# Patient Record
Sex: FEMALE
[De-identification: names, ages, dates, MRNs, and addresses within clinical notes are randomized per-mention and may not be internally consistent; named-entity substitution may affect disease eponyms.]

---

## 2020-12-03 PROBLEM — Z00.00 ENCOUNTER FOR PREVENTIVE HEALTH EXAMINATION: Status: ACTIVE | Noted: 2020-12-03

## 2021-02-04 ENCOUNTER — APPOINTMENT (OUTPATIENT)
Dept: GASTROENTEROLOGY | Facility: CLINIC | Age: 56
End: 2021-02-04

## 2021-04-15 ENCOUNTER — APPOINTMENT (OUTPATIENT)
Dept: GASTROENTEROLOGY | Facility: CLINIC | Age: 56
End: 2021-04-15

## 2022-02-25 ENCOUNTER — APPOINTMENT (OUTPATIENT)
Dept: NEUROLOGY | Facility: CLINIC | Age: 57
End: 2022-02-25

## 2022-03-09 ENCOUNTER — APPOINTMENT (OUTPATIENT)
Dept: NEUROLOGY | Facility: CLINIC | Age: 57
End: 2022-03-09

## 2022-12-05 ENCOUNTER — EMERGENCY (EMERGENCY)
Facility: HOSPITAL | Age: 57
LOS: 1 days | Discharge: HOME | End: 2022-12-05
Attending: EMERGENCY MEDICINE
Payer: COMMERCIAL

## 2022-12-05 VITALS
RESPIRATION RATE: 20 BRPM | OXYGEN SATURATION: 98 % | SYSTOLIC BLOOD PRESSURE: 130 MMHG | HEART RATE: 77 BPM | TEMPERATURE: 99 F | WEIGHT: 162.92 LBS | DIASTOLIC BLOOD PRESSURE: 76 MMHG

## 2022-12-05 LAB
FLUAV AG NPH QL: SIGNIFICANT CHANGE UP
FLUBV AG NPH QL: SIGNIFICANT CHANGE UP
RSV RNA NPH QL NAA+NON-PROBE: SIGNIFICANT CHANGE UP
SARS-COV-2 RNA SPEC QL NAA+PROBE: SIGNIFICANT CHANGE UP

## 2022-12-05 PROCEDURE — 87637 SARSCOV2&INF A&B&RSV AMP PRB: CPT

## 2022-12-05 PROCEDURE — 99284 EMERGENCY DEPT VISIT MOD MDM: CPT

## 2022-12-05 PROCEDURE — 99283 EMERGENCY DEPT VISIT LOW MDM: CPT

## 2022-12-05 RX ORDER — ACETAMINOPHEN 500 MG
650 TABLET ORAL ONCE
Refills: 0 | Status: COMPLETED | OUTPATIENT
Start: 2022-12-05 | End: 2022-12-05

## 2022-12-05 RX ADMIN — Medication 650 MILLIGRAM(S): at 10:06

## 2022-12-05 NOTE — ED PROVIDER NOTE - OBJECTIVE STATEMENT
57 year old female with PMHx of HTN, high cholesterol, and brain aneurysms c/o flu-like symptoms for the last week. States she has nasal congestion, fever, headache, sore throat, body aches, cough for the last night. Symptoms went away after taking nyquil then returned. States she has chest and back discomfort when coughing; productive cough with green phlegm. States had diarrhea this morning. Last ate last night and does not have problem with drinking fluids. States her  has been sick with the flu for the last week and a half. Denies chest pain, SOB, abdominal pain, n/v, urinary complaints, and ear pain. Has 3 COVID vaccines (Pfizer); does not have flu vaccine. Last took tylenol 2 days ago with relief.

## 2022-12-05 NOTE — ED PROVIDER NOTE - PROGRESS NOTE DETAILS
Feeling better. Discussed swab results with patient. Discussed use of OTC medications if needed, fluids, and rest.

## 2022-12-05 NOTE — ED PROVIDER NOTE - NS ED ATTENDING STATEMENT MOD
I have seen and examined this patient and fully participated in the care of this patient as the teaching attending.  The service was shared with the RIZWANA.  I reviewed and verified the documentation and independently performed the documented:

## 2022-12-05 NOTE — ED PROVIDER NOTE - NS ED ROS FT
ENMT: +nasal congestion. +sore throat. Denies ear pain, difficulty swallowing.  Cardio: chest discomfort when coughing.  GI: +diarrhea this morning. Denies abdominal pain, nausea, vomiting, constipation.

## 2022-12-05 NOTE — ED PROVIDER NOTE - NSFOLLOWUPINSTRUCTIONS_ED_ALL_ED_FT
Viral Respiratory Infection    A viral respiratory infection is an illness that affects parts of the body used for breathing, like the lungs, nose, and throat. It is caused by a germ called a virus. Symptoms can include runny nose, coughing, sneezing, fatigue, body aches, sore throat, fever, or headache. Over the counter medicine can be used to manage the symptoms but the infection typically goes away on its own in 5 to 10 days.     SEEK IMMEDIATE MEDICAL CARE IF YOU HAVE ANY OF THE FOLLOWING SYMPTOMS: shortness of breath, chest pain, fever over 10 days, or lightheadedness/dizziness.    Follow-up with your doctor within 2-3 days.  You can take Tylenol or Motrin over the counter, if needed.

## 2022-12-05 NOTE — ED PROVIDER NOTE - PATIENT PORTAL LINK FT
You can access the FollowMyHealth Patient Portal offered by [facility] by registering at the following website: [email or web address]. By joining [employer]’s FollowMyHealth portal, you will also be able to view your health information using other applications (apps) compatible with our system.

## 2022-12-05 NOTE — ED PROVIDER NOTE - ATTENDING CONTRIBUTION TO CARE
pt with cough and fever with + sick contacts pe awake alert in nad heent ncat neck supple cor s1s2 lungs clear abd soft nontender neuro nonfocal  dx couigh;

## 2022-12-05 NOTE — ED PROVIDER NOTE - CLINICAL SUMMARY MEDICAL DECISION MAKING FREE TEXT BOX
57 year old female with PMHx of HTN, high cholesterol, and brain aneurysms c/o flu-like symptoms for the last week. States she has nasal congestion, fever, headache, sore throat, body aches, cough for the last night. Symptoms went away after taking nyquil then returned. States she has chest and back discomfort when coughing; productive cough with green phlegm. States had diarrhea this morning. Last ate last night and does not have problem with drinking fluids. States her  has been sick with the flu for the last week and a half. Denies chest pain, SOB, abdominal pain, n/v, urinary complaints, and ear pain. Has 3 COVID vaccines (Pfizer); does not have flu vaccine. Last took tylenol 2 days ago with relief.    Tylenol. Swab. Reassess.

## 2022-12-05 NOTE — ED ADULT TRIAGE NOTE - BP NONINVASIVE DIASTOLIC (MM HG)
"[facility] [place]  Arun San APRN      Sandi DELUCA Kristina / 70 y.o. / female  12/08/2020      VITAL SIGNS     Visit Vitals  /68   Pulse 77   Temp 96.4 °F (35.8 °C) (Temporal)   Ht 157.5 cm (62.01\")   Wt 65.4 kg (144 lb 3.2 oz)   LMP  (LMP Unknown)   SpO2 98%   Breastfeeding No   BMI 26.37 kg/m²       BP Readings from Last 3 Encounters:   12/08/20 150/68   10/15/20 130/60   07/06/20 130/70     Wt Readings from Last 3 Encounters:   12/08/20 65.4 kg (144 lb 3.2 oz)   10/15/20 64.3 kg (141 lb 12.8 oz)   07/06/20 64.4 kg (142 lb)     Body mass index is 26.37 kg/m².      MEDICATIONS     Current Outpatient Medications   Medication Sig Dispense Refill   • amLODIPine (NORVASC) 2.5 MG tablet TAKE 1 TABLET BY MOUTH EVERY DAY 90 tablet 3   • amLODIPine (NORVASC) 5 MG tablet TAKE 1 AND 1/2 TABLETS BY MOUTH EVERY  tablet 1   • ASPIRIN 81 PO Take 1 tablet by mouth Daily.     • calcium carbonate (OS-ROCIO) 600 MG tablet Take 600 mg by mouth daily.     • cholecalciferol (VITAMIN D3) 1000 UNITS tablet Take 1,000 Units by mouth daily.     • Flaxseed, Linseed, (FLAXSEED OIL PO) Take  by mouth.     • fluticasone (FLONASE) 50 MCG/ACT nasal spray 2 sprays by Each Nare route Daily. 48 mL 2   • Probiotic Product (PROBIOTIC DAILY PO) Take  by mouth.     • vitamin E 1000 UNIT capsule Take 1,000 Units by mouth Daily.     • Zinc 50 MG capsule Take  by mouth.     • cephalexin (Keflex) 500 MG capsule Take 1 capsule by mouth 2 (Two) Times a Day for 7 days. 14 capsule 0   • cyclobenzaprine (FLEXERIL) 5 MG tablet Take 1 tablet by mouth 3 (Three) Times a Day As Needed for Muscle Spasms. 21 tablet 0   • methylPREDNISolone (MEDROL) 4 MG dose pack Take as directed on package instructions. 1 each 0   • mometasone (ELOCON) 0.1 % ointment      • Multiple Vitamins-Minerals ([employer] SPECTRAVITE ADULT 50+ PO) Take  by mouth.       No current facility-administered medications for this visit.        CHIEF COMPLAINT     Sciatica and Urinary Tract " Infection (odor, previous use of Augmentin)      ASSESSMENT & PLAN     Problem List Items Addressed This Visit        Nervous and Auditory    Sciatica of left side - Primary    Relevant Orders    Ambulatory Referral to Pain Management      Other Visit Diagnoses     Foul smelling urine        Relevant Orders    POCT urinalysis dipstick, automated (Completed)    Acute cystitis without hematuria            Orders Placed This Encounter   Procedures   • Ambulatory Referral to Pain Management   • POCT urinalysis dipstick, automated     New Medications Ordered This Visit   Medications   • methylPREDNISolone (MEDROL) 4 MG dose pack     Sig: Take as directed on package instructions.     Dispense:  1 each     Refill:  0   • cyclobenzaprine (FLEXERIL) 5 MG tablet     Sig: Take 1 tablet by mouth 3 (Three) Times a Day As Needed for Muscle Spasms.     Dispense:  21 tablet     Refill:  0   • cephalexin (Keflex) 500 MG capsule     Sig: Take 1 capsule by mouth 2 (Two) Times a Day for 7 days.     Dispense:  14 capsule     Refill:  0       Summary/Discussion:  • Recurrent acute cystitis.  Last treated with Augmentin 875 mg on October 15.  On urinalysis small leukocytes and trace blood.  Will treat empirically with Keflex 500 mg twice daily for 7 days.  • Medrol pack prescription of Flexeril 5 mg 3 times daily as needed for 1 week.  Prescription of Medrol pack for temporary relief of inflammation until she is able to follow-up with pain management for potential injections.  She refuses x-rays of her lumbar spine today.  She does not want into the main hospital due to COVID-19.  • Follow-up as needed or if symptoms worsen or do not improve.  She is to monitor any loss of bowel or bladder or weakness of her lower extremities.  She is to monitor for fever or worsening dysuria or frequency.    Next Appointment with me: Visit date not found    Return if symptoms worsen or fail to  improve.    _____________________________________________________________________________________    HISTORY OF PRESENT ILLNESS     Patient and her medical problems are new to me.  Patient of Dr. Clark.    Acute cystitis/foul urine odor  Last treated on October 15 by PCP with Augmentin 875 mg.  Urine culture showed E. coli.  Today patient is still having small dysuria and frequency along with foul urine odor.  She denies any flank pain, fever, pelvic pain.  Analysis dipstick reveals small leukocytes and trace blood.    Sciatica of left side  Presents with main concern of sciatica radiating down her left leg.  She denies any numbness or tingling or any loss of bladder or bowel.  Pain radiates from her left lumbar hip to foot.  She has taken Tylenol with minimal relief.  She does feel some back tightness and spasms.  She is just left today physical therapy which she was referred to by her PCP.  She does not believe physical therapy is helping her pain.  She declines any x-rays today due to COVID-19 exposure in the main hospital.  She does have some tenderness in her left lumbar area minimal decreased range of motion due to pain.      Patient Care Team:  Leland Clark MD as PCP - General (Internal Medicine)      REVIEW OF SYSTEMS     Review of Systems   Constitutional: Negative.    Respiratory: Negative.    Cardiovascular: Negative.    Gastrointestinal: Negative.    Genitourinary: Positive for dysuria and frequency.   Musculoskeletal: Positive for back pain.   Neurological: Negative for weakness and numbness.   Psychiatric/Behavioral: Negative.      PHYSICAL EXAMINATION     Physical Exam  Constitutional:       Appearance: Normal appearance.   Cardiovascular:      Rate and Rhythm: Normal rate and regular rhythm.      Pulses: Normal pulses.      Heart sounds: Normal heart sounds.   Pulmonary:      Effort: Pulmonary effort is normal.      Breath sounds: Normal breath sounds.   Abdominal:      Palpations: Abdomen is soft.       Tenderness: There is no abdominal tenderness. There is no right CVA tenderness or left CVA tenderness.   Musculoskeletal:      Lumbar back: She exhibits decreased range of motion, tenderness and spasm. She exhibits no swelling, no edema and no deformity.   Neurological:      Mental Status: She is alert and oriented to person, place, and time.   Psychiatric:         Thought Content: Thought content normal.         Judgment: Judgment normal.       REVIEWED DATA     Labs:     Lab Results   Component Value Date     10/15/2020    K 4.6 10/15/2020    CALCIUM 9.3 10/15/2020    AST 31 10/15/2020    ALT 23 10/15/2020    BUN 17 10/15/2020    CREATININE 0.92 10/15/2020    CREATININE 0.82 11/06/2019    CREATININE 0.75 05/03/2019    EGFRIFNONA 60 (L) 10/15/2020    EGFRIFAFRI 73 10/15/2020       Lab Results   Component Value Date    GLU 97 10/15/2020    GLU 95 11/06/2019     (H) 05/03/2019       Lab Results   Component Value Date     (H) 10/15/2020     (H) 11/06/2019     (H) 05/03/2019    HDL 59 10/15/2020    HDL 62 (H) 11/06/2019    HDL 59 05/03/2019    TRIG 75 10/15/2020    TRIG 80 11/06/2019    TRIG 121 05/03/2019       Lab Results   Component Value Date    TSH 2.060 10/15/2020    FREET4 1.20 10/15/2020       Lab Results   Component Value Date    WBC 7.44 12/19/2018    HGB 14.3 12/19/2018    HGB 14.4 10/25/2017    HGB 14.5 05/23/2016     12/19/2018       Lab Results   Component Value Date    PROTEIN Negative 10/15/2020    GLUCOSEU Negative 10/15/2020    BLOODU Negative 10/15/2020    NITRITEU Negative 10/15/2020    LEUKOCYTESUR Small (1+) (A) 12/08/2020       Imaging:         Medical Tests:         Summary of old records / correspondence / consultant report:         Request outside records:         ALLERGIES  Allergies   Allergen Reactions   • Benazepril Cough        PFSH:     The following portions of the patient's history were reviewed and updated as appropriate: Allergies /  Current Medications / Past Medical History / Surgical History / Social History / Family History    PROBLEM LIST   Patient Active Problem List   Diagnosis   • Hypertension, essential   • Dupuytren's contracture of hand (Keo Bauer)   • Glaucoma associated with ocular disorder ([place])   • Low back pain   • Carotid stenosis mild ( 2/2012)   • Blepharitis of both eyes   • Lichen sclerosus (GYN = isabell Garza)   • Agoraphobia   • Urinary incontinence in female   • Squamous cell carcinoma   • Osteopenia   • FH: uterine cancer   • FH: CAD (coronary artery disease)   • Vitamin D deficiency disease   • Postmenopausal   • Panic anxiety syndrome (Xanax)   • Hayfever   • Osteoarthritis   • Routine health maintenance   • Cough due to ACE inhibitor   • COPD (chronic obstructive pulmonary disease) (CMS/[facility])   • Pure hypercholesterolemia   • Palpitations   • Hyperlipidemia   • Subclinical hypothyroidism   • Plantar fasciitis of left foot   • Sciatica of left side       PAST MEDICAL HISTORY  Past Medical History:   Diagnosis Date   • Allergic    • Anxiety    • Arthritis    • Cataract    • COPD (chronic obstructive pulmonary disease) (CMS/[facility])     Mild obstructive defect noted 2017   • Emphysema of lung (CMS/[facility])    • Hypertension    • Low back pain    • Osteopenia    • Plantar fasciitis of left foot 07/2018   • Urinary tract infection    • Visual impairment        SURGICAL HISTORY  Past Surgical History:   Procedure Laterality Date   • CYST REMOVAL Right 12/23/2015    Neck- Dorota Bauer   • EYE SURGERY Left 01/01/1955    to repair lazy eye   • LAPAROSCOPIC TUBAL LIGATION Bilateral 1986   • TUBAL ABDOMINAL LIGATION         SOCIAL HISTORY  Social History     Socioeconomic History   • Marital status:      Spouse name: none   • Number of children: 2   • Years of education: 12    • Highest education level: Not on file   Occupational History   • 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     Employer: [employer]    Tobacco Use   • Smoking status: Never Smoker   • Smokeless tobacco: Never Used   Substance and Sexual Activity   • Alcohol use: No   • Drug use: No   • Sexual activity: Never     Partners: Male   Social History Narrative    Hobby water aerobics, yard work       FAMILY HISTORY  Family History   Problem Relation Age of Onset   • Arthritis Mother            • Cancer Mother         Esophagus   • Glaucoma Mother    • Hypertension Mother            • Stroke Mother            • Osteoporosis Mother    • Ovarian cancer Mother    • Heart disease Mother         Congestive heart   • Heart disease Father    • Stroke Father            • Early death Father         Ceraboral hemmorage   • Hypertension Father            • Thyroid disease Sister    • Hyperlipidemia Brother    • Hypertension Brother    • Asthma Daughter    • Heart disease Maternal Aunt    • Asthma Daughter         She is on meds   • Heart disease Maternal Aunt         Congestive heart   • Heart disease Paternal Aunt         Stroke   • Hyperlipidemia Brother         On meds   • Hypertension Brother    • Stroke Maternal Aunt         On meds   • Thyroid disease Sister         On meds         Examiner was wearing KN95 mask, face shield and exam gloves during the entire duration of the visit. Patient was masked the entire time.   Minimum social distance of 6 ft maintained entire visit except if physical contact was necessary as documented.     **Dragon Disclaimer:   Much of this encounter note is an electronic transcription/translation of spoken language to printed text. The electronic translation of spoken language may permit erroneous, or at times, nonsensical words or phrases to be inadvertently transcribed. Although I have reviewed the note for such errors, some may still exist.    76

## 2023-05-13 ENCOUNTER — EMERGENCY (EMERGENCY)
Facility: HOSPITAL | Age: 58
LOS: 1 days | Discharge: HOME | End: 2023-05-13
Attending: EMERGENCY MEDICINE
Payer: COMMERCIAL

## 2023-05-13 VITALS
HEIGHT: 61 IN | SYSTOLIC BLOOD PRESSURE: 129 MMHG | TEMPERATURE: 99 F | OXYGEN SATURATION: 96 % | DIASTOLIC BLOOD PRESSURE: 92 MMHG | RESPIRATION RATE: 18 BRPM | HEART RATE: 76 BPM | WEIGHT: 160.06 LBS

## 2023-05-13 LAB
RAPID RVP RESULT: SIGNIFICANT CHANGE UP
SARS-COV-2 RNA SPEC QL NAA+PROBE: SIGNIFICANT CHANGE UP

## 2023-05-13 PROCEDURE — 99283 EMERGENCY DEPT VISIT LOW MDM: CPT

## 2023-05-13 PROCEDURE — 71046 X-RAY EXAM CHEST 2 VIEWS: CPT | Mod: 26

## 2023-05-13 PROCEDURE — 0225U NFCT DS DNA&RNA 21 SARSCOV2: CPT

## 2023-05-13 PROCEDURE — T1013: CPT

## 2023-05-13 PROCEDURE — 71046 X-RAY EXAM CHEST 2 VIEWS: CPT

## 2023-05-13 RX ORDER — AZITHROMYCIN 500 MG/1
2 TABLET, FILM COATED ORAL
Qty: 10 | Refills: 0
Start: 2023-05-13 | End: 2023-05-17

## 2023-05-13 NOTE — ED STATDOCS - NS_ ATTENDINGSCRIBEDETAILS _ED_A_ED_FT
I, Marlon Yeboah, performed the initial face to face bedside interview with this patient regarding history of present illness, review of symptoms and relevant past medical, social and family history.  I completed an independent physical examination.  I was the initial provider who evaluated this patient. I have signed out the follow up of any pending tests (i.e. labs, radiological studies) to the resident.  I have communicated the patient’s plan of care and disposition with the resident.  The history, relevant review of systems, past medical and surgical history, medical decision making, and physical examination was documented by the scribe in my presence and I attest to the accuracy of the documentation.

## 2023-05-13 NOTE — ED STATDOCS - OBJECTIVE STATEMENT
59 y/o female with PHMx of HTN and aneurysms presents with cough and chest congestion for 1 week. Reports headaches after coughing. Reports phlegm from chest is green. Reports PHMX of 4 aneurysms and recent surgery for aneurysm and was admitted for 2 days for recovery. Reports allergy to morphine, but no known allergies to antibiotics     : Tatyana

## 2023-05-13 NOTE — ED ADULT NURSE NOTE - NSFALLHARMRISKINTERV_ED_ALL_ED

## 2023-05-13 NOTE — ED ADULT TRIAGE NOTE - CHIEF COMPLAINT QUOTE
pt states she has cough phlegm & congestion x a few days, states she had brain surgery for aneurysm last week  A&Ox3, resp wnl,

## 2023-05-13 NOTE — ED STATDOCS - ENMT, MLM
Nasal mucosa clear.  Mouth with normal mucosa  Throat has no vesicles, no oropharyngeal erythema or exudates and uvula is midline.

## 2023-05-13 NOTE — ED STATDOCS - PATIENT PORTAL LINK FT
You can access the FollowMyHealth Patient Portal offered by [facility] by registering at the following website: [email or web address]. By joining [employer]’s FollowMyHealth portal, you will also be able to view your health information using other applications (apps) compatible with our system.

## 2023-07-11 ENCOUNTER — EMERGENCY (EMERGENCY)
Facility: HOSPITAL | Age: 58
LOS: 1 days | Discharge: LEFT BEFORE DISCHARGE | End: 2023-07-11
Payer: COMMERCIAL

## 2023-07-11 VITALS
WEIGHT: 164.46 LBS | SYSTOLIC BLOOD PRESSURE: 144 MMHG | DIASTOLIC BLOOD PRESSURE: 92 MMHG | RESPIRATION RATE: 18 BRPM | TEMPERATURE: 99 F | HEART RATE: 99 BPM | OXYGEN SATURATION: 99 % | HEIGHT: 61 IN

## 2023-07-11 PROCEDURE — 99282 EMERGENCY DEPT VISIT SF MDM: CPT

## 2023-07-11 PROCEDURE — L9991: CPT

## 2023-07-11 NOTE — ED ADULT TRIAGE NOTE - CHIEF COMPLAINT QUOTE
body aches, subjective fevers, watery eyes x 2 days. Pt diagnosed with COVID yesterday, instructed to take tylenol/motrin for fevers. Pt took tylenol at 1900.

## 2023-09-25 ENCOUNTER — EMERGENCY (EMERGENCY)
Facility: HOSPITAL | Age: 58
LOS: 1 days | Discharge: HOME | End: 2023-09-25
Attending: EMERGENCY MEDICINE
Payer: COMMERCIAL

## 2023-09-25 VITALS
RESPIRATION RATE: 20 BRPM | SYSTOLIC BLOOD PRESSURE: 147 MMHG | HEART RATE: 70 BPM | OXYGEN SATURATION: 98 % | DIASTOLIC BLOOD PRESSURE: 78 MMHG | TEMPERATURE: 99 F

## 2023-09-25 VITALS
TEMPERATURE: 98 F | HEIGHT: 66 IN | DIASTOLIC BLOOD PRESSURE: 91 MMHG | RESPIRATION RATE: 16 BRPM | WEIGHT: 162.04 LBS | SYSTOLIC BLOOD PRESSURE: 156 MMHG | OXYGEN SATURATION: 100 % | HEART RATE: 77 BPM

## 2023-09-25 LAB
RAPID RVP RESULT: DETECTED
RV+EV RNA SPEC QL NAA+PROBE: DETECTED
S PYO DNA THROAT QL NAA+PROBE: SIGNIFICANT CHANGE UP
SARS-COV-2 RNA SPEC QL NAA+PROBE: SIGNIFICANT CHANGE UP

## 2023-09-25 PROCEDURE — 87651 STREP A DNA AMP PROBE: CPT

## 2023-09-25 PROCEDURE — 70450 CT HEAD/BRAIN W/O DYE: CPT | Mod: 26,MA

## 2023-09-25 PROCEDURE — 0225U NFCT DS DNA&RNA 21 SARSCOV2: CPT

## 2023-09-25 PROCEDURE — 87798 DETECT AGENT NOS DNA AMP: CPT

## 2023-09-25 PROCEDURE — 99284 EMERGENCY DEPT VISIT MOD MDM: CPT

## 2023-09-25 PROCEDURE — 70450 CT HEAD/BRAIN W/O DYE: CPT | Mod: MA

## 2023-09-25 PROCEDURE — T1013: CPT

## 2023-09-25 RX ORDER — ACETAMINOPHEN 500 MG
650 TABLET ORAL ONCE
Refills: 0 | Status: COMPLETED | OUTPATIENT
Start: 2023-09-25 | End: 2023-09-25

## 2023-09-25 RX ORDER — ONDANSETRON 8 MG/1
4 TABLET, FILM COATED ORAL ONCE
Refills: 0 | Status: COMPLETED | OUTPATIENT
Start: 2023-09-25 | End: 2023-09-25

## 2023-09-25 RX ADMIN — ONDANSETRON 4 MILLIGRAM(S): 8 TABLET, FILM COATED ORAL at 11:51

## 2023-09-25 RX ADMIN — Medication 650 MILLIGRAM(S): at 11:51

## 2023-09-25 NOTE — ED PROVIDER NOTE - PROGRESS NOTE DETAILS
JK - CT WNL, tolerating PO, ambulating on her own. Ready for DC. Patient informed she is entero/rhino virus positive.

## 2023-09-25 NOTE — ED PROVIDER NOTE - NSFOLLOWUPINSTRUCTIONS_ED_ALL_ED_FT
Infección de las vías respiratorias superiores en adultos  Upper Respiratory Infection, Adult  Merrill infección de las vías respiratorias superiores (IVRS) afecta la nariz, la garganta y las vías respiratorias superiores que llegan a los pulmones. El tipo más común de IVRS suele conocerse presley el resfrío común. Las IVRS generalmente mejoran solas, sin tratamiento médico.    ¿Cuáles son las causas?  La causa de las IVRS es un germen (virus). Puede contraer estos gérmenes de las siguientes maneras:  Al aspirar las gotitas que merrill persona infectada elimina al toser o estornudar.  Al tocar algo que tiene el germen (está contaminado) y luego tocarse la boca, la nariz o los ojos.  ¿Qué incrementa el riesgo?  Es más propenso a contraer merrill IVRS si:  Es muy pequeño o de edad muy avanzada.  Tiene contacto cercano con otros, presley en el trabajo, la escuela o un centro de atención médica.  Fuma.  Tiene merrill enfermedad cardíaca o pulmonar a taylor plazo (crónica).  Tiene debilitado el sistema encargado de combatir las enfermedades (sistema inmunitario).  Tiene asma o alergias nasales.  Tiene mucho estrés.  Tiene un déficit nutricional.  ¿Cuáles son los signos o síntomas?  Secreción nasal o nariz tapada (congestión).  Tos.  Estornudos.  Dolor de garganta.  Dolor de shell.  Sensación de cansancio (fatiga).  Fiebre.  No querer comer tanto presley lo hace habitualmente.  Dolor en la frente, detrás de los ojos y por encima de los pómulos (dolor sinusal).  Jennyfer musculares.  Enrojecimiento o irritación de los ojos.  Presión en los oídos o la rhona.  ¿Cómo se trata?  Las IVRS generalmente mejoran por sí solas en un período de entre 7 y 10 días. Los medicamentos no curan las IVRS, jeannine el médico puede recomendarle ciertos medicamentos para ayudar a aliviar los síntomas, presley por ejemplo:  Medicamentos para la tos de venta rosi.  Medicamentos para reducir la tos (antitusivos). La tos es un tipo de defensa contra las infecciones que ayuda a despejar la nariz, la garganta, la tráquea y los pulmones (el sistema respiratorio). [place] estos medicamentos solamente presley se lo haya indicado el médico.  Medicamentos para bajar la fiebre.  Siga estas instrucciones en eason casa:  Actividad    Descanse todo lo que sea necesario.  Si tiene fiebre, permanezca en eason casa, sin ir al trabajo o a la escuela, hasta que ya no tenga fiebre, o hasta que el médico le indique que puede regresar al trabajo o a la escuela.  Debe permanecer en eason casa hasta que ya no pueda propagar (contagiar) la infección.  Es posible que el médico le indique que use merrill mascarilla para tener menos riesgo de propagar la infección.  Para aliviar los síntomas    Enjuáguese la boca frecuentemente con merrill mezcla de agua con sal. Para preparar agua con sal, disuelva de ½ a 1 cucharadita (de 3 a 6 g) de sal en 1 taza (237 ml) de agua tibia.  Use un humidificador de aire frío para agregar humedad al aire. Keno puede ayudarlo a que respire mejor.  Comida y bebida    Three cups showing dark yellow, yellow, and pale yellow pee.  Jane suficiente líquido para mantener la orina de color amarillo pálido.  [place] sopas y caldos transparentes.  Instrucciones generales    A sign showing that a person should not smoke.  Use los medicamentos de venta rosi y los recetados solamente presley se lo haya indicado el médico.  No fume ni consuma ningún producto que contenga nicotina o tabaco. Si necesita ayuda para dejar de fumar, consulte al médico.  Evite estar cerca de personas que fuman (evite el humo ambiental de tabaco).  Manténgase al día con todas las vacunas (inmunizaciones) y aplíquese la vacuna contra la gripe todos los años.  Concurra a todas las visitas de seguimiento.  Cómo evitar contagiar la infección a otros    Washing hands with soap and water.  Lávese las verenice con agua y jabón maría al menos 20 segundos. Use un desinfectante para verenice si no dispone de agua y jabón.  Evite tocarse la boca, la rhona, los ojos o la nariz.  Tosa o estornude en un pañuelo de papel o sobre eason manga o codo. No tosa o estornude al aire ni se cubra la boca o la nariz con la mano.  Comuníquese con un médico si:  Siente que empeora o que no mejora.  Tiene alguno de estos síntomas:  Fiebre o escalofríos.  Mucosidad color marrón o estrella en la nariz.  Líquido amarillento o amarronado (secreción)que le sale de la nariz.  Dolor en la rhona, especialmente al inclinarse hacia adelante.  Ganglios del ekaterina inflamados.  Dolor al tragar.  Zonas sandi en la parte de atrás de la garganta.  Solicite ayuda de inmediato si:  La falta de aire empeora.  Los siguientes síntomas son muy intensos o constantes:  Dolor de shell.  Dolor de oído.  Dolor en la frente, detrás de los ojos y por encima de los pómulos (dolor sinusal).  Dolor de pecho.  Tiene merrill enfermedad pulmonar prolongada (crónica) junto con cualquiera de estos síntomas:  Emitir sonidos de silbidos agudos al respirar, más a menudo al exhalar (sibilancias).  Tos prolongada (más de 14 días).  Tos con ana.  Cambio en la mucosidad habitual.  Tiene rigidez en el ekaterina.  Tiene cambios en:  La visión.  La audición.  El razonamiento.  El estado de ánimo.  Estos síntomas pueden indicar merrill emergencia. Solicite ayuda de inmediato. Llame al 911.  No espere a clive si los síntomas desaparecen.  No conduzca por bong propios medios hasta el hospital.  Resumen  Merrill infección de las vías respiratorias superiores (IVRS) es causada por un germen (virus). El tipo más común de IVRS suele conocerse presley el resfrío común.  Merrill IVRS suele mejorar en el transcurso de 7 a 10 días.  Use los medicamentos de venta rosi y los recetados solamente presley se lo haya indicado el médico.  Esta información no tiene presley fin reemplazar el consejo del médico. Asegúrese de hacerle al médico cualquier pregunta que tenga.

## 2023-09-25 NOTE — ED PROVIDER NOTE - OBJECTIVE STATEMENT
58F hx cerebral aneurysms presents with body aches, cough, nasal congestion, sore throat since yesterday. She reports positive sick contact with a family member. Also has had L sided HA and posttussive emesis after eating breakfast today. Denies fever, chills, abd pain, CP, SOB, back pain, dysuria, weakness, numbness, tingling

## 2023-09-25 NOTE — ED PROVIDER NOTE - ATTENDING CONTRIBUTION TO CARE
[facility] : [facility]   Reports left-sided headache, pain to the left side of the face, cough with whitish phlegm production, sore throat and body aches since yesterday.  Denies fever.  Has not taken any medication for symptoms.  Denies neck stiffness.  Physical exam: Nontoxic-appearing, no acute distress, erythema of oropharynx with swelling of uvula, no exudate, neck supple/no meningismus, tender left submandibular lymph adenopathy, chest clear to auscultation bilateral, no sinus percussion tenderness.  RVP swab positive for rhinovirus.  A/P URI with uvulitis:   anticipatory guidance provided and supportive care recommended

## 2023-09-25 NOTE — ED PROVIDER NOTE - PHYSICAL EXAMINATION
General: Awake, alert, sitting up in chair in NAD. Occasional dry cough  HEENT: Normocephalic, atraumatic. No scleral icterus or conjunctival injection. EOMI. Moist mucous membranes. Oropharynx clear.   Neck:. Soft and supple.  Cardiac: RRR, Peripheral pulses 2+ and symmetric. No LE edema.  Resp: Lungs CTAB. No accessory muscle use  Abd: Soft, non-tender, non-distended. No guarding, rebound, or rigidity.  Back: Spine midline and non-tender.   Skin: No rashes, abrasions, or lacerations.  Neuro: AO x 4. Moves all extremities symmetrically. Motor strength and sensation grossly intact.  Psych: Appropriate mood and affect

## 2023-09-25 NOTE — ED ADULT NURSE NOTE - OBJECTIVE STATEMENT
Assumed care at 1140 pt co body aches, sneezing, coughing, headaches and congestion for a couple of days, denies any fever, chills.

## 2023-09-25 NOTE — ED PROVIDER NOTE - CLINICAL SUMMARY MEDICAL DECISION MAKING FREE TEXT BOX
58F hx cerebral aneurysms presents for 2 days of URI sx in setting of positive sick contact at home. Will eval with RVP. Given hx of aneurysms with HA and vomiting, will also obtain CT head to eval for occult bleed. Sx control and reassess

## 2023-09-25 NOTE — ED PROVIDER NOTE - NS ED ROS FT
Gen: body aches  HEENT: Congestion, HA, sore throat  Resp: Cough  GI: vomiting  Otherwise neg except HPI

## 2023-09-25 NOTE — ED PROVIDER NOTE - PATIENT PORTAL LINK FT
You can access the FollowMyHealth Patient Portal offered by [facility] by registering at the following website: [email or web address]. By joining [facility]’s FollowMyHealth portal, you will also be able to view your health information using other applications (apps) compatible with our system.

## 2024-03-18 NOTE — ED PROVIDER NOTE - EAR
Please call to check how patient doing.  Should be seen if still with fevers.   bilateral TM's clear

## 2025-04-30 NOTE — ED ADULT TRIAGE NOTE - HEIGHT IN INCHES
Returned patient's call and advised of the information below. The patient expressed understanding and was appreciative of the phone call.     Alton Urbina, Soni Manzanares RN  Caller: Unspecified (Today,  9:37 AM)  Does not need a new pre-op with me.  Jardiance and eliquis both need to be stopped 3 days prior to surgery.  Let me know if she needs to speak with me directly.    [id number]   1